# Patient Record
Sex: MALE | Race: WHITE | Employment: FULL TIME | URBAN - METROPOLITAN AREA
[De-identification: names, ages, dates, MRNs, and addresses within clinical notes are randomized per-mention and may not be internally consistent; named-entity substitution may affect disease eponyms.]

---

## 2017-07-18 ENCOUNTER — HOSPITAL ENCOUNTER (OUTPATIENT)
Age: 42
Discharge: HOME | End: 2017-07-18
Attending: FAMILY MEDICINE
Payer: COMMERCIAL

## 2017-07-18 VITALS
HEIGHT: 69 IN | WEIGHT: 185 LBS | DIASTOLIC BLOOD PRESSURE: 77 MMHG | RESPIRATION RATE: 18 BRPM | BODY MASS INDEX: 27.4 KG/M2 | TEMPERATURE: 99 F | HEART RATE: 68 BPM | SYSTOLIC BLOOD PRESSURE: 118 MMHG | OXYGEN SATURATION: 97 %

## 2017-07-18 DIAGNOSIS — L03.90 CELLULITIS, UNSPECIFIED CELLULITIS SITE: ICD-10-CM
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
## 2017-07-18 PROCEDURE — 99214 OFFICE O/P EST MOD 30 MIN: CPT

## 2017-07-18 PROCEDURE — 99213 OFFICE O/P EST LOW 20 MIN: CPT

## 2017-07-18 RX ORDER — SULFAMETHOXAZOLE AND TRIMETHOPRIM 800; 160 MG/1; MG/1
1 TABLET ORAL 2 TIMES DAILY
Qty: 14 TABLET | Refills: 0 | Status: SHIPPED | OUTPATIENT
Start: 2017-07-18 | End: 2017-07-25

## 2017-07-18 NOTE — ED PROVIDER NOTES
Patient Seen in: [id number] [facility] - [place]    History   Patient presents with:  Bite Sting,Insect (integumentary)    Stated Complaint: bug bite on the arm-rash    HPI  34 yo M here with bug bite - unsure of what bit him but thinks it likely is a sp except for the anteromedial aspect of the upper arm - L arm - small 3 cm diameter erythematous area with central induration and mild tenderness and a linear streak extending proximally into the arm.    Eyes: wnl, normal conjunctiva   HEAD: Normocephalic, at R-0

## 2017-07-18 NOTE — ED INITIAL ASSESSMENT (HPI)
Pt with large red bump to left upper arm. Pt c/o streak to arm. Denies fevers.   Unknown what bite is from

## 2017-11-17 ENCOUNTER — APPOINTMENT (OUTPATIENT)
Dept: GENERAL RADIOLOGY | Age: 42
End: 2017-11-17
Attending: NURSE PRACTITIONER
Payer: COMMERCIAL

## 2017-11-17 ENCOUNTER — HOSPITAL ENCOUNTER (OUTPATIENT)
Age: 42
Discharge: HOME | End: 2017-11-17
Payer: COMMERCIAL

## 2017-11-17 VITALS
RESPIRATION RATE: 16 BRPM | OXYGEN SATURATION: 98 % | HEART RATE: 65 BPM | WEIGHT: 185 LBS | DIASTOLIC BLOOD PRESSURE: 78 MMHG | TEMPERATURE: 98 F | SYSTOLIC BLOOD PRESSURE: 118 MMHG | BODY MASS INDEX: 27 KG/M2

## 2017-11-17 DIAGNOSIS — J20.8 ACUTE VIRAL BRONCHITIS: Primary | ICD-10-CM

## 2017-11-17 PROCEDURE — 99214 OFFICE O/P EST MOD 30 MIN: CPT

## 2017-11-17 PROCEDURE — 99213 OFFICE O/P EST LOW 20 MIN: CPT

## 2017-11-17 PROCEDURE — 71020 XR CHEST PA + LAT CHEST (CPT=71020): CPT | Performed by: NURSE PRACTITIONER

## 2017-11-17 RX ORDER — ALBUTEROL SULFATE 90 UG/1
2 AEROSOL, METERED RESPIRATORY (INHALATION) EVERY 4 HOURS PRN
Qty: 1 INHALER | Refills: 0 | Status: SHIPPED | OUTPATIENT
Start: 2017-11-17 | End: 2017-12-17

## 2017-11-17 RX ORDER — BENZONATATE 100 MG/1
100 CAPSULE ORAL 3 TIMES DAILY PRN
Qty: 30 CAPSULE | Refills: 0 | Status: SHIPPED | OUTPATIENT
Start: 2017-11-17 | End: 2017-12-17

## 2017-11-17 RX ORDER — PREDNISONE 20 MG/1
20 TABLET ORAL 2 TIMES DAILY
Qty: 10 TABLET | Refills: 0 | Status: SHIPPED | OUTPATIENT
Start: 2017-11-17 | End: 2017-11-22

## 2017-11-17 NOTE — ED INITIAL ASSESSMENT (HPI)
Cough for one month, no fevers, tried antihistamines, \"Nothing seems to work. \" no face pain or ear pain.

## 2017-11-18 NOTE — ED PROVIDER NOTES
Patient Seen in: [address] [facility] [place]    History   Patient presents with:  Cough/URI    Stated Complaint: coughing    52-year-old male who presents to the immediate care with complaints of a nonproductive cough ×1 month, denies fever.   Patient negative except as noted above.     Physical Exam   ED Triage Vitals  BP: 114/81 [11/17/17 1342]  Pulse: 81 [11/17/17 1342]  Resp: 20 [11/17/17 1342]  Temp: (!) 97 °F (36.1 °C) [11/17/17 1342]  Temp src: Temporal [11/17/17 1342]  SpO2: 98 % [11/17/17 1342] at 14:44     Approved by: Maximilian Christiansen MD            ED Course as of Nov 17 1932  ------------------------------------------------------------       MDM     I discussed the radiology  results with the patient.  I discussed the diagnosis and need for fol

## 2018-03-14 ENCOUNTER — HOSPITAL ENCOUNTER (OUTPATIENT)
Age: 43
Discharge: HOME | End: 2018-03-14
Payer: COMMERCIAL

## 2018-03-14 VITALS
WEIGHT: 190 LBS | DIASTOLIC BLOOD PRESSURE: 90 MMHG | SYSTOLIC BLOOD PRESSURE: 134 MMHG | BODY MASS INDEX: 28 KG/M2 | RESPIRATION RATE: 16 BRPM | HEART RATE: 81 BPM | TEMPERATURE: 97 F | OXYGEN SATURATION: 98 %

## 2018-03-14 DIAGNOSIS — H69.82 EUSTACHIAN TUBE DYSFUNCTION, LEFT: ICD-10-CM

## 2018-03-14 DIAGNOSIS — H65.02 ACUTE SEROUS OTITIS MEDIA OF LEFT EAR, RECURRENCE NOT SPECIFIED: Primary | ICD-10-CM

## 2018-03-14 PROCEDURE — 99213 OFFICE O/P EST LOW 20 MIN: CPT

## 2018-03-14 PROCEDURE — 99214 OFFICE O/P EST MOD 30 MIN: CPT

## 2018-03-14 RX ORDER — AMOXICILLIN 875 MG/1
875 TABLET, COATED ORAL 2 TIMES DAILY
Qty: 20 TABLET | Refills: 0 | Status: SHIPPED | OUTPATIENT
Start: 2018-03-14 | End: 2018-03-24

## 2018-03-14 NOTE — ED PROVIDER NOTES
Patient Seen in: [address] [facility] [place]    History   Patient presents with:  Ear Problem Pain (neurosensory)    Stated Complaint: ear pain     40-year-old male who presents to the immediate care with complaints of left ear pain ×1 week.   Patient 81  Resp: 16  Temp: (!) 97.3 °F (36.3 °C)  Temp src: Temporal  SpO2: 98 %  O2 Device: None (Room air)    Current:/90   Pulse 81   Temp (!) 97.3 °F (36.3 °C) (Temporal)   Resp 16   Wt 86.2 kg   SpO2 98%   BMI 28.06 kg/m²         Physical Exam   Consti Prescribed:  Discharge Medication List as of 3/14/2018  5:48 PM    START taking these medications    amoxicillin 875 MG Oral Tab  Take 1 tablet (875 mg total) by mouth 2 (two) times daily. , Normal, Disp-20 tablet, R-0

## 2018-03-14 NOTE — ED INITIAL ASSESSMENT (HPI)
Pt with c/o left ear pain for past week. Pt with uri sx.   Denies fevers Controlled with current regime

## 2019-02-21 ENCOUNTER — TELEPHONE (OUTPATIENT)
Dept: RHEUMATOLOGY | Age: 44
End: 2019-02-21